# Patient Record
Sex: FEMALE | Race: WHITE | NOT HISPANIC OR LATINO
[De-identification: names, ages, dates, MRNs, and addresses within clinical notes are randomized per-mention and may not be internally consistent; named-entity substitution may affect disease eponyms.]

---

## 2017-12-15 ENCOUNTER — APPOINTMENT (OUTPATIENT)
Dept: GASTROENTEROLOGY | Facility: CLINIC | Age: 52
End: 2017-12-15
Payer: COMMERCIAL

## 2017-12-15 VITALS
WEIGHT: 276 LBS | SYSTOLIC BLOOD PRESSURE: 120 MMHG | DIASTOLIC BLOOD PRESSURE: 70 MMHG | HEIGHT: 67 IN | TEMPERATURE: 97.8 F | BODY MASS INDEX: 43.32 KG/M2

## 2017-12-15 DIAGNOSIS — I10 ESSENTIAL (PRIMARY) HYPERTENSION: ICD-10-CM

## 2017-12-15 DIAGNOSIS — Z83.42 FAMILY HISTORY OF FAMILIAL HYPERCHOLESTEROLEMIA: ICD-10-CM

## 2017-12-15 DIAGNOSIS — Z82.5 FAMILY HISTORY OF ASTHMA AND OTHER CHRONIC LOWER RESPIRATORY DISEASES: ICD-10-CM

## 2017-12-15 DIAGNOSIS — Z82.49 FAMILY HISTORY OF ISCHEMIC HEART DISEASE AND OTHER DISEASES OF THE CIRCULATORY SYSTEM: ICD-10-CM

## 2017-12-15 DIAGNOSIS — Z83.49 FAMILY HISTORY OF OTHER ENDOCRINE, NUTRITIONAL AND METABOLIC DISEASES: ICD-10-CM

## 2017-12-15 PROCEDURE — 99214 OFFICE O/P EST MOD 30 MIN: CPT

## 2017-12-15 RX ORDER — FOLIC ACID 1 MG/1
1 TABLET ORAL
Qty: 30 | Refills: 0 | Status: ACTIVE | COMMUNITY
Start: 2017-11-27

## 2017-12-15 RX ORDER — TOBRAMYCIN AND DEXAMETHASONE 3; 1 MG/ML; MG/ML
0.3-0.1 SUSPENSION/ DROPS OPHTHALMIC
Qty: 5 | Refills: 0 | Status: DISCONTINUED | COMMUNITY
Start: 2017-07-18

## 2017-12-15 RX ORDER — CLINDAMYCIN PHOSPHATE 10 MG/G
1 AEROSOL, FOAM TOPICAL
Qty: 100 | Refills: 0 | Status: DISCONTINUED | COMMUNITY
Start: 2017-08-02

## 2017-12-15 RX ORDER — METHYLPREDNISOLONE 4 MG/1
4 TABLET ORAL
Qty: 21 | Refills: 0 | Status: DISCONTINUED | COMMUNITY
Start: 2017-07-05

## 2017-12-15 RX ORDER — CLOBETASOL PROPIONATE 0.5 MG/G
0.05 CREAM TOPICAL
Qty: 15 | Refills: 0 | Status: ACTIVE | COMMUNITY
Start: 2017-12-08

## 2017-12-15 RX ORDER — ROSUVASTATIN CALCIUM 5 MG/1
5 TABLET, FILM COATED ORAL
Qty: 30 | Refills: 0 | Status: ACTIVE | COMMUNITY
Start: 2017-10-30

## 2017-12-15 RX ORDER — UBIDECARENONE/VIT E ACET 100MG-5
CAPSULE ORAL
Refills: 0 | Status: ACTIVE | COMMUNITY

## 2017-12-15 RX ORDER — DOXYCYCLINE 40 MG/1
40 CAPSULE ORAL
Qty: 30 | Refills: 0 | Status: DISCONTINUED | COMMUNITY
Start: 2017-08-02

## 2017-12-15 RX ORDER — FOLIC ACID 0.8 MG
500 TABLET ORAL
Refills: 0 | Status: ACTIVE | COMMUNITY

## 2017-12-15 RX ORDER — DESONIDE 0.5 MG/G
0.05 OINTMENT TOPICAL
Qty: 30 | Refills: 0 | Status: DISCONTINUED | COMMUNITY
Start: 2017-08-09

## 2017-12-15 RX ORDER — LEVOTHYROXINE SODIUM 200 UG/1
200 TABLET ORAL
Qty: 90 | Refills: 0 | Status: ACTIVE | COMMUNITY
Start: 2017-01-30

## 2017-12-15 RX ORDER — NEBIVOLOL HYDROCHLORIDE 2.5 MG/1
2.5 TABLET ORAL
Qty: 25 | Refills: 0 | Status: ACTIVE | COMMUNITY
Start: 2017-10-30

## 2017-12-19 ENCOUNTER — APPOINTMENT (OUTPATIENT)
Dept: GASTROENTEROLOGY | Facility: CLINIC | Age: 52
End: 2017-12-19

## 2018-02-15 ENCOUNTER — APPOINTMENT (OUTPATIENT)
Dept: SURGICAL ONCOLOGY | Facility: CLINIC | Age: 53
End: 2018-02-15
Payer: COMMERCIAL

## 2018-02-15 VITALS
HEIGHT: 67 IN | WEIGHT: 275 LBS | DIASTOLIC BLOOD PRESSURE: 81 MMHG | HEART RATE: 72 BPM | BODY MASS INDEX: 43.16 KG/M2 | RESPIRATION RATE: 15 BRPM | SYSTOLIC BLOOD PRESSURE: 140 MMHG

## 2018-02-15 DIAGNOSIS — Z87.19 PERSONAL HISTORY OF OTHER DISEASES OF THE DIGESTIVE SYSTEM: ICD-10-CM

## 2018-02-15 DIAGNOSIS — Z86.39 PERSONAL HISTORY OF OTHER ENDOCRINE, NUTRITIONAL AND METABOLIC DISEASE: ICD-10-CM

## 2018-02-15 DIAGNOSIS — Z87.891 PERSONAL HISTORY OF NICOTINE DEPENDENCE: ICD-10-CM

## 2018-02-15 DIAGNOSIS — Z87.09 PERSONAL HISTORY OF OTHER DISEASES OF THE RESPIRATORY SYSTEM: ICD-10-CM

## 2018-02-15 PROCEDURE — 99244 OFF/OP CNSLTJ NEW/EST MOD 40: CPT

## 2018-02-15 RX ORDER — ASPIRIN 81 MG
81 TABLET, DELAYED RELEASE (ENTERIC COATED) ORAL
Refills: 0 | Status: ACTIVE | COMMUNITY

## 2018-02-23 ENCOUNTER — RESULT REVIEW (OUTPATIENT)
Age: 53
End: 2018-02-23

## 2018-02-23 ENCOUNTER — OUTPATIENT (OUTPATIENT)
Dept: OUTPATIENT SERVICES | Facility: HOSPITAL | Age: 53
LOS: 1 days | End: 2018-02-23
Payer: COMMERCIAL

## 2018-02-23 DIAGNOSIS — L98.9 DISORDER OF THE SKIN AND SUBCUTANEOUS TISSUE, UNSPECIFIED: ICD-10-CM

## 2018-02-23 PROCEDURE — 88321 CONSLTJ&REPRT SLD PREP ELSWR: CPT

## 2018-02-27 LAB — SURGICAL PATHOLOGY STUDY: SIGNIFICANT CHANGE UP

## 2018-03-15 ENCOUNTER — FORM ENCOUNTER (OUTPATIENT)
Age: 53
End: 2018-03-15

## 2018-03-16 ENCOUNTER — OUTPATIENT (OUTPATIENT)
Dept: OUTPATIENT SERVICES | Facility: HOSPITAL | Age: 53
LOS: 1 days | End: 2018-03-16
Payer: COMMERCIAL

## 2018-03-16 VITALS
OXYGEN SATURATION: 97 % | DIASTOLIC BLOOD PRESSURE: 78 MMHG | RESPIRATION RATE: 16 BRPM | WEIGHT: 272.93 LBS | HEIGHT: 67 IN | TEMPERATURE: 98 F | HEART RATE: 71 BPM | SYSTOLIC BLOOD PRESSURE: 128 MMHG

## 2018-03-16 DIAGNOSIS — L23.9 ALLERGIC CONTACT DERMATITIS, UNSPECIFIED CAUSE: ICD-10-CM

## 2018-03-16 DIAGNOSIS — D03.61 MELANOMA IN SITU OF RIGHT UPPER LIMB, INCLUDING SHOULDER: ICD-10-CM

## 2018-03-16 DIAGNOSIS — Z98.890 OTHER SPECIFIED POSTPROCEDURAL STATES: Chronic | ICD-10-CM

## 2018-03-16 DIAGNOSIS — I10 ESSENTIAL (PRIMARY) HYPERTENSION: ICD-10-CM

## 2018-03-16 DIAGNOSIS — Z01.818 ENCOUNTER FOR OTHER PREPROCEDURAL EXAMINATION: ICD-10-CM

## 2018-03-16 DIAGNOSIS — G47.33 OBSTRUCTIVE SLEEP APNEA (ADULT) (PEDIATRIC): ICD-10-CM

## 2018-03-16 LAB
ANION GAP SERPL CALC-SCNC: 12 MMOL/L — SIGNIFICANT CHANGE UP (ref 5–17)
BUN SERPL-MCNC: 12 MG/DL — SIGNIFICANT CHANGE UP (ref 7–23)
CALCIUM SERPL-MCNC: 10.3 MG/DL — SIGNIFICANT CHANGE UP (ref 8.4–10.5)
CHLORIDE SERPL-SCNC: 103 MMOL/L — SIGNIFICANT CHANGE UP (ref 96–108)
CO2 SERPL-SCNC: 29 MMOL/L — SIGNIFICANT CHANGE UP (ref 22–31)
CREAT SERPL-MCNC: 0.8 MG/DL — SIGNIFICANT CHANGE UP (ref 0.5–1.3)
GLUCOSE SERPL-MCNC: 102 MG/DL — HIGH (ref 70–99)
POTASSIUM SERPL-MCNC: 5.3 MMOL/L — SIGNIFICANT CHANGE UP (ref 3.5–5.3)
POTASSIUM SERPL-SCNC: 5.3 MMOL/L — SIGNIFICANT CHANGE UP (ref 3.5–5.3)
SODIUM SERPL-SCNC: 144 MMOL/L — SIGNIFICANT CHANGE UP (ref 135–145)

## 2018-03-16 PROCEDURE — 71046 X-RAY EXAM CHEST 2 VIEWS: CPT | Mod: 26

## 2018-03-16 PROCEDURE — 80048 BASIC METABOLIC PNL TOTAL CA: CPT

## 2018-03-16 PROCEDURE — 71046 X-RAY EXAM CHEST 2 VIEWS: CPT

## 2018-03-16 PROCEDURE — G0463: CPT

## 2018-03-16 RX ORDER — SODIUM CHLORIDE 9 MG/ML
3 INJECTION INTRAMUSCULAR; INTRAVENOUS; SUBCUTANEOUS EVERY 8 HOURS
Qty: 0 | Refills: 0 | Status: DISCONTINUED | OUTPATIENT
Start: 2018-03-23 | End: 2018-04-07

## 2018-03-16 NOTE — H&P PST ADULT - SKIN/BREAST COMMENTS
Rash face and chest  ( contact dermatitis) seeing Allergist and being tested, pt will find out results today Rash face and chest  ( Pt reports it is contact dermatitis) seeing Allergist and being tested, pt will find out results today

## 2018-03-16 NOTE — H&P PST ADULT - HISTORY OF PRESENT ILLNESS
52 Y/O Female 54 Y/O Female reports she went to dermatologist because she had a dark mole on her right shoulder. S/P biopsy right shoulder. Diagnosed with Melanoma right shoulder. Presents for Wide excison Melanoma right shoulder , Plastics 3/23/18

## 2018-03-16 NOTE — H&P PST ADULT - PMH
Allergic contact dermatitis, unspecified trigger  Face and chest, pt is in process of skin testing by dermatologist, Rash is not on right shoulder, advised pt to call Dr Swartz if the rash is on right shoulder. Pt states she thinks in contact dermatitis.  Diverticulitis    Essential hypertension    Hyperlipidemia, unspecified hyperlipidemia type    Hypothyroid    Mass of breast, right  benign 2012  MARIAA on CPAP

## 2018-03-16 NOTE — H&P PST ADULT - SKIN
detailed exam color normal/warm and dry/slight rash face and chest, it is not affecting right shoulder.

## 2018-03-16 NOTE — H&P PST ADULT - PROBLEM SELECTOR PLAN 4
Instructed pt to call Dr Swartz if rash goes to right shoulder. Advised Bettie Swartz OR booking person about rash.

## 2018-03-16 NOTE — H&P PST ADULT - ATTENDING COMMENTS
53-year-old lady recently diagnosed with a possible 0.25 mm melanoma of the posterior right shoulder. The in situ lesion has focal invasion to a depth of 0.25 mm with no prognostic factors.    The diagnosis and technique for resection had been reviewed with the patient in my office, and again this morning with her and her friend Ms Mckenna.    All questions answered.    Consent on chart

## 2018-03-20 ENCOUNTER — APPOINTMENT (OUTPATIENT)
Dept: GASTROENTEROLOGY | Facility: AMBULATORY MEDICAL SERVICES | Age: 53
End: 2018-03-20
Payer: COMMERCIAL

## 2018-03-20 PROCEDURE — G0121 COLON CA SCRN NOT HI RSK IND: CPT | Mod: 33

## 2018-03-22 ENCOUNTER — TRANSCRIPTION ENCOUNTER (OUTPATIENT)
Age: 53
End: 2018-03-22

## 2018-03-23 ENCOUNTER — RESULT REVIEW (OUTPATIENT)
Age: 53
End: 2018-03-23

## 2018-03-23 ENCOUNTER — OUTPATIENT (OUTPATIENT)
Dept: OUTPATIENT SERVICES | Facility: HOSPITAL | Age: 53
LOS: 1 days | End: 2018-03-23
Payer: COMMERCIAL

## 2018-03-23 ENCOUNTER — APPOINTMENT (OUTPATIENT)
Dept: SURGICAL ONCOLOGY | Facility: HOSPITAL | Age: 53
End: 2018-03-23

## 2018-03-23 VITALS
HEART RATE: 64 BPM | DIASTOLIC BLOOD PRESSURE: 68 MMHG | WEIGHT: 272.93 LBS | OXYGEN SATURATION: 98 % | RESPIRATION RATE: 16 BRPM | TEMPERATURE: 98 F | HEIGHT: 67 IN | SYSTOLIC BLOOD PRESSURE: 120 MMHG

## 2018-03-23 VITALS
RESPIRATION RATE: 17 BRPM | DIASTOLIC BLOOD PRESSURE: 67 MMHG | SYSTOLIC BLOOD PRESSURE: 121 MMHG | OXYGEN SATURATION: 100 % | HEART RATE: 78 BPM

## 2018-03-23 DIAGNOSIS — Z98.890 OTHER SPECIFIED POSTPROCEDURAL STATES: Chronic | ICD-10-CM

## 2018-03-23 DIAGNOSIS — Z01.818 ENCOUNTER FOR OTHER PREPROCEDURAL EXAMINATION: ICD-10-CM

## 2018-03-23 DIAGNOSIS — D03.61 MELANOMA IN SITU OF RIGHT UPPER LIMB, INCLUDING SHOULDER: ICD-10-CM

## 2018-03-23 PROCEDURE — 23071 EXC SHOULDER LES SC 3 CM/>: CPT | Mod: RT

## 2018-03-23 PROCEDURE — 15736 MUSCLE-SKIN GRAFT ARM: CPT

## 2018-03-23 PROCEDURE — 11606 EXC TR-EXT MAL+MARG >4 CM: CPT

## 2018-03-23 RX ORDER — NEBIVOLOL HYDROCHLORIDE 5 MG/1
1 TABLET ORAL
Qty: 0 | Refills: 0 | COMMUNITY

## 2018-03-23 RX ORDER — FAMOTIDINE 10 MG/ML
0 INJECTION INTRAVENOUS
Qty: 0 | Refills: 0 | COMMUNITY

## 2018-03-23 RX ORDER — ASPIRIN/CALCIUM CARB/MAGNESIUM 324 MG
1 TABLET ORAL
Qty: 0 | Refills: 0 | COMMUNITY

## 2018-03-23 RX ORDER — FOLIC ACID 0.8 MG
0 TABLET ORAL
Qty: 0 | Refills: 0 | COMMUNITY

## 2018-03-23 RX ORDER — ROSUVASTATIN CALCIUM 5 MG/1
0 TABLET ORAL
Qty: 0 | Refills: 0 | COMMUNITY

## 2018-03-23 RX ORDER — LEVOTHYROXINE SODIUM 125 MCG
1 TABLET ORAL
Qty: 0 | Refills: 0 | COMMUNITY

## 2018-03-23 RX ORDER — SODIUM CHLORIDE 9 MG/ML
1000 INJECTION, SOLUTION INTRAVENOUS
Qty: 0 | Refills: 0 | Status: DISCONTINUED | OUTPATIENT
Start: 2018-03-23 | End: 2018-04-07

## 2018-03-23 NOTE — BRIEF OPERATIVE NOTE - PROCEDURE
<<-----Click on this checkbox to enter Procedure Melanoma excision  03/23/2018  Possible 0.25 mm melanoma posterior right shoulder, excision with reconstruction by Dr. Darline BRUMFIELD

## 2018-03-23 NOTE — ASU DISCHARGE PLAN (ADULT/PEDIATRIC). - ITEMS TO FOLLOWUP WITH YOUR PHYSICIAN'S
See Dr. Gregorio in the next week or two.    Dr. Swartz should call with pathology report by April 03 Follow up with Dr. Gregorio/Dr. Rowe (partner) on April 4th, 2018. You can call office to confirm your appointment.    Dr. Swartz should call with pathology report by April 03

## 2018-03-23 NOTE — CHART NOTE - NSCHARTNOTEFT_GEN_A_CORE
Patient seen & examined    53 year old female with no prior history of malignancy presents for excision of right shoulder melanoma in situ    Exam  Ontop of R shoulder, small ~ 5mm pigmented lesion with biopsy scar noted    Plan  Wide local excision with margins of 0.5 cm for melanoma in situ of R shoulder    Agustin Skinner  PGY-1

## 2018-03-23 NOTE — ASU DISCHARGE PLAN (ADULT/PEDIATRIC). - YOU WERE IN THE HOSPITAL FOR:
Excision of melanoma of the back of right shoulder with reconstruction by Dr. Gregorio Excision of melanoma of the back of right shoulder with complex closure for reconstruciton

## 2018-03-23 NOTE — ASU DISCHARGE PLAN (ADULT/PEDIATRIC). - MEDICATION SUMMARY - MEDICATIONS TO TAKE
I will START or STAY ON the medications listed below when I get home from the hospital:    aspirin 81 mg oral tablet  -- 1 tab(s) by mouth once a day (at bedtime)  -- Indication: For Home medication    rosuvastatin  -- orally once a day (at bedtime)  -- Indication: For Home medication    Bystolic 2.5 mg oral tablet  -- 1 tab(s) by mouth once a day (at bedtime)  -- Indication: For Home medication    Synthroid 200 mcg (0.2 mg) oral tablet  -- 1 tab(s) by mouth once a day  -- Indication: For Home medication    folic acid  -- orally once a day (at bedtime)  -- Indication: For Home medication

## 2018-03-23 NOTE — ASU DISCHARGE PLAN (ADULT/PEDIATRIC). - NOTIFY
Swelling that continues/Persistent Nausea and Vomiting/Increased Irritability or Sluggishness/Inability to Tolerate Liquids or Foods/Numbness, color, or temperature change to extremity/Pain not relieved by Medications/Excessive Diarrhea/Fever greater than 101/Numbness, tingling/Bleeding that does not stop/Unable to Urinate Pain not relieved by Medications/Fever greater than 101/Persistent Nausea and Vomiting/Bleeding that does not stop/Numbness, color, or temperature change to extremity/Swelling that continues

## 2018-03-29 LAB — SURGICAL PATHOLOGY STUDY: SIGNIFICANT CHANGE UP

## 2018-04-23 ENCOUNTER — APPOINTMENT (OUTPATIENT)
Dept: SURGICAL ONCOLOGY | Facility: CLINIC | Age: 53
End: 2018-04-23
Payer: COMMERCIAL

## 2018-04-23 VITALS
HEART RATE: 62 BPM | BODY MASS INDEX: 43.16 KG/M2 | RESPIRATION RATE: 15 BRPM | SYSTOLIC BLOOD PRESSURE: 130 MMHG | DIASTOLIC BLOOD PRESSURE: 85 MMHG | HEIGHT: 67 IN | WEIGHT: 275 LBS

## 2018-04-23 DIAGNOSIS — C43.61 MALIGNANT MELANOMA OF RIGHT UPPER LIMB, INCLUDING SHOULDER: ICD-10-CM

## 2018-04-23 PROCEDURE — 99215 OFFICE O/P EST HI 40 MIN: CPT

## 2018-07-17 PROBLEM — L23.9 ALLERGIC CONTACT DERMATITIS, UNSPECIFIED CAUSE: Chronic | Status: ACTIVE | Noted: 2018-03-16

## 2018-11-15 ENCOUNTER — APPOINTMENT (OUTPATIENT)
Dept: SURGICAL ONCOLOGY | Facility: CLINIC | Age: 53
End: 2018-11-15
Payer: COMMERCIAL

## 2018-11-15 VITALS
WEIGHT: 280 LBS | SYSTOLIC BLOOD PRESSURE: 117 MMHG | HEART RATE: 78 BPM | BODY MASS INDEX: 43.95 KG/M2 | HEIGHT: 67 IN | OXYGEN SATURATION: 98 % | DIASTOLIC BLOOD PRESSURE: 75 MMHG

## 2018-11-15 PROCEDURE — 99213 OFFICE O/P EST LOW 20 MIN: CPT

## 2018-11-16 PROBLEM — E78.5 HYPERLIPIDEMIA, UNSPECIFIED: Chronic | Status: ACTIVE | Noted: 2018-03-16

## 2018-11-16 PROBLEM — K57.92 DIVERTICULITIS OF INTESTINE, PART UNSPECIFIED, WITHOUT PERFORATION OR ABSCESS WITHOUT BLEEDING: Chronic | Status: ACTIVE | Noted: 2018-03-16

## 2018-11-16 PROBLEM — E03.9 HYPOTHYROIDISM, UNSPECIFIED: Chronic | Status: ACTIVE | Noted: 2018-03-16

## 2018-11-16 PROBLEM — I10 ESSENTIAL (PRIMARY) HYPERTENSION: Chronic | Status: ACTIVE | Noted: 2018-03-16

## 2018-11-16 PROBLEM — G47.33 OBSTRUCTIVE SLEEP APNEA (ADULT) (PEDIATRIC): Chronic | Status: ACTIVE | Noted: 2018-03-16

## 2018-11-16 PROBLEM — N63.10 UNSPECIFIED LUMP IN THE RIGHT BREAST, UNSPECIFIED QUADRANT: Chronic | Status: ACTIVE | Noted: 2018-03-16

## 2019-03-26 ENCOUNTER — APPOINTMENT (OUTPATIENT)
Dept: GASTROENTEROLOGY | Facility: CLINIC | Age: 54
End: 2019-03-26
Payer: COMMERCIAL

## 2019-03-26 VITALS
WEIGHT: 281 LBS | DIASTOLIC BLOOD PRESSURE: 76 MMHG | BODY MASS INDEX: 44.1 KG/M2 | SYSTOLIC BLOOD PRESSURE: 112 MMHG | HEART RATE: 72 BPM | OXYGEN SATURATION: 98 % | HEIGHT: 67 IN | TEMPERATURE: 99 F

## 2019-03-26 PROCEDURE — 99214 OFFICE O/P EST MOD 30 MIN: CPT

## 2019-03-26 NOTE — ASSESSMENT
[FreeTextEntry1] : Impression\par \par Patient presents to the office today with complaints of left low quadrant discomfort improved she has a history of mild diverticulitis and has had previous CT and colonoscopy within the last 18 months. As she is feeling better I see no need to further investigate at this time. Continue antibiotic for one week and discontinue with observation for additional symptoms of nausea vomiting fever chills rectal bleeding et cetera. Patient will call back p.r.n.

## 2019-03-26 NOTE — HISTORY OF PRESENT ILLNESS
[FreeTextEntry1] : Patient presents with complaints of left lower quadrant pain. Patient went to walk-in medical clinic and was diagnosed with mild diverticulitis she was started on antibiotic therapy. Patient had been feeling some discomfort and felt that it was similar to previously diagnosed diverticulitis. Patient is currently feeling better within 24 hours of antibiotic treatment she denies current fever chills nausea vomiting. She still has slight left lower quadrant discomfort.

## 2019-03-26 NOTE — PHYSICAL EXAM
[General Appearance - Alert] : alert [General Appearance - In No Acute Distress] : in no acute distress [General Appearance - Well Nourished] : well nourished [General Appearance - Well Developed] : well developed [General Appearance - Well-Appearing] : healthy appearing [Sclera] : the sclera and conjunctiva were normal [PERRL With Normal Accommodation] : pupils were equal in size, round, and reactive to light [Extraocular Movements] : extraocular movements were intact [Outer Ear] : the ears and nose were normal in appearance [Oropharynx] : the oropharynx was normal [Neck Appearance] : the appearance of the neck was normal [Neck Cervical Mass (___cm)] : no neck mass was observed [Jugular Venous Distention Increased] : there was no jugular-venous distention [Thyroid Diffuse Enlargement] : the thyroid was not enlarged [Thyroid Nodule] : there were no palpable thyroid nodules [Auscultation Breath Sounds / Voice Sounds] : lungs were clear to auscultation bilaterally [Heart Rate And Rhythm] : heart rate was normal and rhythm regular [Heart Sounds] : normal S1 and S2 [Heart Sounds Gallop] : no gallops [Murmurs] : no murmurs [Heart Sounds Pericardial Friction Rub] : no pericardial rub [Edema] : there was no peripheral edema [Veins - Varicosity Changes] : there were no varicosital changes [Bowel Sounds] : normal bowel sounds [Abdomen Soft] : soft [Abdomen Mass (___ Cm)] : no abdominal mass palpated [FreeTextEntry1] : minimal  [Cervical Lymph Nodes Enlarged Posterior Bilaterally] : posterior cervical [Cervical Lymph Nodes Enlarged Anterior Bilaterally] : anterior cervical [Supraclavicular Lymph Nodes Enlarged Bilaterally] : supraclavicular [No CVA Tenderness] : no ~M costovertebral angle tenderness [No Spinal Tenderness] : no spinal tenderness [Abnormal Walk] : normal gait [Nail Clubbing] : no clubbing  or cyanosis of the fingernails [Musculoskeletal - Swelling] : no joint swelling seen [Motor Tone] : muscle strength and tone were normal [Skin Color & Pigmentation] : normal skin color and pigmentation [Skin Turgor] : normal skin turgor [] : no rash [Deep Tendon Reflexes (DTR)] : deep tendon reflexes were 2+ and symmetric [Sensation] : the sensory exam was normal to light touch and pinprick [No Focal Deficits] : no focal deficits [Oriented To Time, Place, And Person] : oriented to person, place, and time [Impaired Insight] : insight and judgment were intact [Affect] : the affect was normal

## 2019-05-22 ENCOUNTER — APPOINTMENT (OUTPATIENT)
Dept: GASTROENTEROLOGY | Facility: CLINIC | Age: 54
End: 2019-05-22

## 2019-11-14 ENCOUNTER — APPOINTMENT (OUTPATIENT)
Dept: SURGICAL ONCOLOGY | Facility: CLINIC | Age: 54
End: 2019-11-14
Payer: COMMERCIAL

## 2019-11-14 VITALS
OXYGEN SATURATION: 97 % | SYSTOLIC BLOOD PRESSURE: 128 MMHG | DIASTOLIC BLOOD PRESSURE: 69 MMHG | HEIGHT: 67 IN | HEART RATE: 65 BPM

## 2019-11-14 PROCEDURE — 99214 OFFICE O/P EST MOD 30 MIN: CPT

## 2019-11-21 ENCOUNTER — FORM ENCOUNTER (OUTPATIENT)
Age: 54
End: 2019-11-21

## 2019-11-22 ENCOUNTER — APPOINTMENT (OUTPATIENT)
Dept: RADIOLOGY | Facility: CLINIC | Age: 54
End: 2019-11-22
Payer: COMMERCIAL

## 2019-11-22 ENCOUNTER — OUTPATIENT (OUTPATIENT)
Dept: OUTPATIENT SERVICES | Facility: HOSPITAL | Age: 54
LOS: 1 days | End: 2019-11-22
Payer: COMMERCIAL

## 2019-11-22 DIAGNOSIS — Z98.890 OTHER SPECIFIED POSTPROCEDURAL STATES: Chronic | ICD-10-CM

## 2019-11-22 DIAGNOSIS — Z00.8 ENCOUNTER FOR OTHER GENERAL EXAMINATION: ICD-10-CM

## 2019-11-22 PROCEDURE — 71046 X-RAY EXAM CHEST 2 VIEWS: CPT

## 2019-11-22 PROCEDURE — 71046 X-RAY EXAM CHEST 2 VIEWS: CPT | Mod: 26

## 2020-01-23 ENCOUNTER — APPOINTMENT (OUTPATIENT)
Dept: GASTROENTEROLOGY | Facility: CLINIC | Age: 55
End: 2020-01-23
Payer: COMMERCIAL

## 2020-01-23 VITALS
HEART RATE: 70 BPM | WEIGHT: 290 LBS | TEMPERATURE: 97.8 F | BODY MASS INDEX: 45.52 KG/M2 | DIASTOLIC BLOOD PRESSURE: 80 MMHG | OXYGEN SATURATION: 98 % | HEIGHT: 67 IN | SYSTOLIC BLOOD PRESSURE: 120 MMHG

## 2020-01-23 DIAGNOSIS — K57.32 DIVERTICULITIS OF LARGE INTESTINE W/OUT PERFORATION OR ABSCESS W/OUT BLEEDING: ICD-10-CM

## 2020-01-23 PROCEDURE — 99214 OFFICE O/P EST MOD 30 MIN: CPT

## 2020-01-23 RX ORDER — METRONIDAZOLE 500 MG/1
500 TABLET ORAL
Qty: 21 | Refills: 0 | Status: ACTIVE | COMMUNITY
Start: 2017-12-05 | End: 1900-01-01

## 2020-01-23 NOTE — PHYSICAL EXAM
[General Appearance - Alert] : alert [General Appearance - Well Nourished] : well nourished [General Appearance - Well Developed] : well developed [General Appearance - In No Acute Distress] : in no acute distress [Sclera] : the sclera and conjunctiva were normal [PERRL With Normal Accommodation] : pupils were equal in size, round, and reactive to light [General Appearance - Well-Appearing] : healthy appearing [Extraocular Movements] : extraocular movements were intact [Outer Ear] : the ears and nose were normal in appearance [Oropharynx] : the oropharynx was normal [Neck Appearance] : the appearance of the neck was normal [Neck Cervical Mass (___cm)] : no neck mass was observed [Thyroid Diffuse Enlargement] : the thyroid was not enlarged [Thyroid Nodule] : there were no palpable thyroid nodules [Jugular Venous Distention Increased] : there was no jugular-venous distention [Heart Rate And Rhythm] : heart rate was normal and rhythm regular [Auscultation Breath Sounds / Voice Sounds] : lungs were clear to auscultation bilaterally [Heart Sounds Gallop] : no gallops [Murmurs] : no murmurs [Heart Sounds] : normal S1 and S2 [Edema] : there was no peripheral edema [Heart Sounds Pericardial Friction Rub] : no pericardial rub [Veins - Varicosity Changes] : there were no varicosital changes [Bowel Sounds] : normal bowel sounds [Abdomen Mass (___ Cm)] : no abdominal mass palpated [Abdomen Soft] : soft [Cervical Lymph Nodes Enlarged Posterior Bilaterally] : posterior cervical [FreeTextEntry1] : minimal  [Cervical Lymph Nodes Enlarged Anterior Bilaterally] : anterior cervical [Supraclavicular Lymph Nodes Enlarged Bilaterally] : supraclavicular [No Spinal Tenderness] : no spinal tenderness [No CVA Tenderness] : no ~M costovertebral angle tenderness [Abnormal Walk] : normal gait [Motor Tone] : muscle strength and tone were normal [Musculoskeletal - Swelling] : no joint swelling seen [Nail Clubbing] : no clubbing  or cyanosis of the fingernails [Skin Color & Pigmentation] : normal skin color and pigmentation [Skin Turgor] : normal skin turgor [Deep Tendon Reflexes (DTR)] : deep tendon reflexes were 2+ and symmetric [Sensation] : the sensory exam was normal to light touch and pinprick [] : no rash [No Focal Deficits] : no focal deficits [Oriented To Time, Place, And Person] : oriented to person, place, and time [Affect] : the affect was normal [Impaired Insight] : insight and judgment were intact

## 2020-01-23 NOTE — HISTORY OF PRESENT ILLNESS
[FreeTextEntry1] : Patient came to the office today with mild diverticulitis. She has typical symptoms of the left lower quadrant she self started antibiotics he came today for further discussion and refill of medication.

## 2020-01-23 NOTE — ASSESSMENT
[FreeTextEntry1] : Impression and plan\par \par Patient presents to the office today for symptoms suggestive of mild diverticulitis. Patient self started antibiotics that she had left over from last time and is feeling a bit better. I suggested that given clinical history and presentation and she continues at least a 5 day course of oral antibiotics including Cipro and Flagyl which were refilled today. Patient will call back if any worsening or change in symptoms. Telephone follow up in 5 days is requested patient will submit to a colonoscopy later this year and CT scan will be obtained if required.

## 2020-03-06 ENCOUNTER — APPOINTMENT (OUTPATIENT)
Dept: GASTROENTEROLOGY | Facility: CLINIC | Age: 55
End: 2020-03-06
Payer: COMMERCIAL

## 2020-03-06 VITALS
DIASTOLIC BLOOD PRESSURE: 68 MMHG | HEART RATE: 89 BPM | HEIGHT: 67 IN | SYSTOLIC BLOOD PRESSURE: 120 MMHG | BODY MASS INDEX: 45.52 KG/M2 | WEIGHT: 290 LBS | OXYGEN SATURATION: 99 % | TEMPERATURE: 97.6 F

## 2020-03-06 DIAGNOSIS — R19.5 OTHER FECAL ABNORMALITIES: ICD-10-CM

## 2020-03-06 DIAGNOSIS — Z87.19 PERSONAL HISTORY OF OTHER DISEASES OF THE DIGESTIVE SYSTEM: ICD-10-CM

## 2020-03-06 DIAGNOSIS — K59.00 CONSTIPATION, UNSPECIFIED: ICD-10-CM

## 2020-03-06 PROCEDURE — 99214 OFFICE O/P EST MOD 30 MIN: CPT

## 2020-03-06 NOTE — ASSESSMENT
[FreeTextEntry1] : Impression\par \par Mucus with stool after vacation in [place] resort\par \par Constipation\par \par History of diverticulitis\par \par Normal abdominal exam\par \par Suggest\par \par CBC today\par \par Stool studies\par \par Follow up with me or Dr. Tone Gibson\par \par Miralax

## 2020-03-06 NOTE — CONSULT LETTER
[Dear  ___] : Dear  [unfilled], [Consult Letter:] : I had the pleasure of evaluating your patient, [unfilled]. [Please see my note below.] : Please see my note below. [Consult Closing:] : Thank you very much for allowing me to participate in the care of this patient.  If you have any questions, please do not hesitate to contact me. [Sincerely,] : Sincerely, [FreeTextEntry2] : Dr. Osorio Severino\par \par Internal medicine\par \par [address] [facility] [facility], [place] [address]\par \par [phone] [FreeTextEntry3] : Alfonso Duke MD\par

## 2020-03-06 NOTE — PHYSICAL EXAM
[General Appearance - Alert] : alert [General Appearance - In No Acute Distress] : in no acute distress [FreeTextEntry1] : Pleasant heavyset female, no acute distress alert and oriented times [Sclera] : the sclera and conjunctiva were normal [Neck Appearance] : the appearance of the neck was normal [Neck Cervical Mass (___cm)] : no neck mass was observed [Jugular Venous Distention Increased] : there was no jugular-venous distention [Auscultation Breath Sounds / Voice Sounds] : lungs were clear to auscultation bilaterally [Apical Impulse] : the apical impulse was normal [Heart Rate And Rhythm] : heart rate was normal and rhythm regular [Full Pulse] : the pedal pulses are present [Edema] : there was no peripheral edema [Bowel Sounds] : normal bowel sounds [Abdomen Soft] : soft [Abdomen Tenderness] : non-tender [Abdomen Mass (___ Cm)] : no abdominal mass palpated [Normal Sphincter Tone] : normal sphincter tone [No Rectal Mass] : no rectal mass [Occult Blood Positive] : stool was negative for occult blood [Cervical Lymph Nodes Enlarged Posterior Bilaterally] : posterior cervical [Cervical Lymph Nodes Enlarged Anterior Bilaterally] : anterior cervical [Supraclavicular Lymph Nodes Enlarged Bilaterally] : supraclavicular [Axillary Lymph Nodes Enlarged Bilaterally] : axillary [Femoral Lymph Nodes Enlarged Bilaterally] : femoral [Inguinal Lymph Nodes Enlarged Bilaterally] : inguinal [No CVA Tenderness] : no ~M costovertebral angle tenderness [No Spinal Tenderness] : no spinal tenderness [Abnormal Walk] : normal gait [Nail Clubbing] : no clubbing  or cyanosis of the fingernails [Musculoskeletal - Swelling] : no joint swelling seen [Motor Tone] : muscle strength and tone were normal [Skin Color & Pigmentation] : normal skin color and pigmentation [Skin Turgor] : normal skin turgor [] : no rash [No Focal Deficits] : no focal deficits [Oriented To Time, Place, And Person] : oriented to person, place, and time [Impaired Insight] : insight and judgment were intact [Affect] : the affect was normal

## 2020-03-06 NOTE — HISTORY OF PRESENT ILLNESS
[de-identified] : Dr. Osorio Severino\par \par Internal medicine\par \par [address] [facility], [place] [address]\par \par [phone]\par \par \par Very pleasant 55-year-old female\par \par History of diverticulitis\par \par She's having several weeks of mucus with stool\par \par No blood per rectum\par \par Mild constipation\par \par No abdominal pain\par \par No fever, chills or sweats\par \par This occurred after a family vacation to a resort [place]\par \par \par

## 2020-03-07 LAB
BASOPHILS # BLD AUTO: 0.04 K/UL
BASOPHILS NFR BLD AUTO: 0.5 %
EOSINOPHIL # BLD AUTO: 0.28 K/UL
EOSINOPHIL NFR BLD AUTO: 3.4 %
HCT VFR BLD CALC: 44.4 %
HGB BLD-MCNC: 14.2 G/DL
IMM GRANULOCYTES NFR BLD AUTO: 0.2 %
LYMPHOCYTES # BLD AUTO: 1.86 K/UL
LYMPHOCYTES NFR BLD AUTO: 22.4 %
MAN DIFF?: NORMAL
MCHC RBC-ENTMCNC: 28.5 PG
MCHC RBC-ENTMCNC: 32 GM/DL
MCV RBC AUTO: 89.2 FL
MONOCYTES # BLD AUTO: 0.76 K/UL
MONOCYTES NFR BLD AUTO: 9.1 %
NEUTROPHILS # BLD AUTO: 5.36 K/UL
NEUTROPHILS NFR BLD AUTO: 64.4 %
PLATELET # BLD AUTO: 251 K/UL
RBC # BLD: 4.98 M/UL
RBC # FLD: 12.9 %
WBC # FLD AUTO: 8.32 K/UL

## 2020-03-09 LAB
C DIFF TOX GENS STL QL NAA+PROBE: NORMAL
CDIFF BY PCR: DETECTED
G LAMBLIA AG STL QL: NORMAL
GI PCR PANEL, STOOL: NORMAL

## 2020-03-09 RX ORDER — VANCOMYCIN HYDROCHLORIDE 250 MG/1
250 CAPSULE ORAL 4 TIMES DAILY
Qty: 56 | Refills: 0 | Status: ACTIVE | COMMUNITY
Start: 2020-03-09 | End: 1900-01-01

## 2020-03-11 LAB
BACTERIA STL CULT: NORMAL
C DIFF TOX B STL QL CT TISS CULT: ABNORMAL
Lab: ABNORMAL

## 2020-03-12 LAB — DEPRECATED O AND P PREP STL: NORMAL

## 2020-04-14 ENCOUNTER — APPOINTMENT (OUTPATIENT)
Dept: GASTROENTEROLOGY | Facility: CLINIC | Age: 55
End: 2020-04-14
Payer: COMMERCIAL

## 2020-04-14 PROCEDURE — 99213 OFFICE O/P EST LOW 20 MIN: CPT

## 2020-04-14 RX ORDER — VANCOMYCIN HYDROCHLORIDE 250 MG/1
250 CAPSULE ORAL 4 TIMES DAILY
Qty: 40 | Refills: 2 | Status: ACTIVE | COMMUNITY
Start: 2020-04-14 | End: 1900-01-01

## 2020-04-14 NOTE — HISTORY OF PRESENT ILLNESS
[Home] : at home, [unfilled] , at the time of the visit. [Medical Office: ([facility])___] : at the medical office located in  [Patient] : the patient [Self] : self [FreeTextEntry1] : Patient and I had a telephonic visit secondary to current coronavirus situation. Patient called the office because of renewed symptomatology of mucus and rectal urgency. Patient has been recently treated for C. difficile colitis with vancomycin and shortly after discontinuing medication she noted some recurrence. She was concerned about this and called. Patient currently denies fever chills nausea vomiting rectal bleeding or melena. She does have some mucus admixed with the stool. She has some rectal urgency as well.

## 2020-04-18 NOTE — HISTORY OF PRESENT ILLNESS
[de-identified] : 54 year-old lady who March 2018 had wide excision of a possible 0.25 mm melanoma from the posterior RIGHT SHOULDER with negative margins and reconstruction by Dr. Gregorio.\par \par February 2017 she had wide excision of an in situ melanoma from her of her RIGHT SHOULDER, again with negative margins.\par \par No other personal history of skin cancer.\par \par No other personal history of malignancy.\par \par No relatives with melanoma.\par There are no relatives with skin cancer.\par \par She returns to scheduled followup today.\par She is asymptomatic\par \par \par \par Her dermatologist was Dr. Rodrigo Crook.\par NOW sees Dr Perri BOATENG @[facility], October 2019 visit was unremarkable.\par She also sees Dr. Tonie Ravi, December 2019 appointment is scheduled\par \par \par She has OBSTRUCTIVE SLEEP APNEA, her pulmonologist is Dr. Med MORENO, she uses a CPAP mask at home\par \par \par \par Her internist is Dr. Osorio CHANEL.\par \par She does not have a pacemaker or defibrillator.\par She takes precautionary baby aspirin daily.\par \par She's on Bystolic for hypertension.\par Rosuvastatin is used for hypercholesterolemia.\par She is on Synthroid for hypothyroidism, She does not see an endocrinologist.\par \par Her gynecologist is Dr. Raphael CARBAJAL\par She last saw him September 2019 - [place]\par \par Her annual breast imaging is orchestrated By her gynecologist , August 2019 at [place] - ok\par \par +FH:\par Her maternal grandmother had breast cancer at age 52.\par Her paternal grandmother had ovarian cancer.\par \par An eye examination November 2019 was normal., Dr Alfonso Marte\par \par She had colonoscopy March 2018 with Dr. Edmar Frye - [place] x 5 yr\par History of diverticular disease

## 2020-04-18 NOTE — ASSESSMENT
[FreeTextEntry1] : March 2018 chest x-ray at [place] was unremarkable.\par Consideration will be given to a followup study in 2019, Prescription entered\par \par Clinically doing well.\par I've asked to see her in another year, sooner if needed\par \par \par 11-22-19:\par Chest x-ray at [place]: clear lungs.\par Prescription for November 2020 was entered 04/18/20

## 2020-04-18 NOTE — PHYSICAL EXAM
[Normal] : supple, no neck mass and thyroid not enlarged [Normal Neck Lymph Nodes] : normal neck lymph nodes  [Normal Supraclavicular Lymph Nodes] : normal supraclavicular lymph nodes [Normal Axillary Lymph Nodes] : normal axillary lymph nodes [Normal] : grossly intact [de-identified] : Groins not examined [de-identified] : Below

## 2020-04-18 NOTE — REVIEW OF SYSTEMS
[Negative] : Heme/Lymph [FreeTextEntry5] : Hypertension [FreeTextEntry6] : MARIAA [de-identified] : Melanoma [de-identified] : Hypothyroid

## 2020-05-02 RX ORDER — VANCOMYCIN HYDROCHLORIDE 250 MG/1
250 CAPSULE ORAL 4 TIMES DAILY
Qty: 40 | Refills: 2 | Status: ACTIVE | COMMUNITY
Start: 2020-05-02 | End: 1900-01-01

## 2020-05-20 RX ORDER — SODIUM SULFATE, POTASSIUM SULFATE, MAGNESIUM SULFATE 17.5; 3.13; 1.6 G/ML; G/ML; G/ML
17.5-3.13-1.6 SOLUTION, CONCENTRATE ORAL
Qty: 1 | Refills: 0 | Status: ACTIVE | COMMUNITY
Start: 2020-05-20 | End: 1900-01-01

## 2020-05-21 RX ORDER — VANCOMYCIN HYDROCHLORIDE 250 MG/1
250 CAPSULE ORAL 4 TIMES DAILY
Qty: 40 | Refills: 2 | Status: ACTIVE | COMMUNITY
Start: 2020-05-21 | End: 1900-01-01

## 2020-05-26 LAB
C DIFF TOXIN B QL PCR REFLEX: NORMAL
GDH ANTIGEN: DETECTED
GI PCR PANEL, STOOL: NORMAL
TOXIN A AND B: DETECTED

## 2020-05-27 ENCOUNTER — APPOINTMENT (OUTPATIENT)
Dept: CT IMAGING | Facility: CLINIC | Age: 55
End: 2020-05-27

## 2020-06-03 ENCOUNTER — APPOINTMENT (OUTPATIENT)
Dept: GASTROENTEROLOGY | Facility: AMBULATORY MEDICAL SERVICES | Age: 55
End: 2020-06-03

## 2020-06-04 ENCOUNTER — APPOINTMENT (OUTPATIENT)
Dept: GASTROENTEROLOGY | Facility: CLINIC | Age: 55
End: 2020-06-04
Payer: COMMERCIAL

## 2020-06-04 PROCEDURE — 99213 OFFICE O/P EST LOW 20 MIN: CPT | Mod: 95

## 2020-06-04 NOTE — HISTORY OF PRESENT ILLNESS
[Medical Office: ([facility])___] : at the medical office located in  [Home] : at home, [unfilled] , at the time of the visit. [Verbal consent obtained from patient] : the patient, [unfilled] [FreeTextEntry1] : Patient and I had a telephonic video visit secondary to current coronavirus situation the visit lasted approximately 20 minutes. The patient has been in touch with me by phone on multiple occasions over recent weeks. She had recurrent C. difficile after tapering her vancomycin. Patient is currently feeling better and has now completed a two-week course of vancomycin 250 q.i.d. She currently denies nausea vomiting fever chills rectal bleeding or melena. Bowel movements are described as formed and regular

## 2020-06-04 NOTE — ASSESSMENT
[FreeTextEntry1] : Impression and plan\par \par Patient had recurrent C. difficile infection she is now completing a course of vancomycin for the last 2 weeks and feels better. At this time I will recommend tapering vancomycin over the next 2 weeks as instructed. She will call me before discontinuing her last dose and we will decide together if additional therapy might be required. Patient to call back again if any new issues or complaints. Eventual colonoscopy for evaluation of colonic mucosa is suggested. Patient will schedule sometime within the next month or 2.

## 2020-06-08 ENCOUNTER — APPOINTMENT (OUTPATIENT)
Dept: INFECTIOUS DISEASE | Facility: CLINIC | Age: 55
End: 2020-06-08
Payer: COMMERCIAL

## 2020-06-08 PROCEDURE — 99443: CPT

## 2020-06-08 RX ORDER — CIPROFLOXACIN HYDROCHLORIDE 500 MG/1
500 TABLET, FILM COATED ORAL TWICE DAILY
Qty: 14 | Refills: 0 | Status: DISCONTINUED | COMMUNITY
Start: 2017-12-05 | End: 2020-06-08

## 2020-06-08 RX ORDER — CLINDAMYCIN PHOSPHATE 10 MG/ML
1 LOTION TOPICAL
Qty: 120 | Refills: 0 | Status: DISCONTINUED | COMMUNITY
Start: 2019-09-23 | End: 2020-06-08

## 2020-06-08 NOTE — CONSULT LETTER
[Dear  ___] : Dear  [unfilled], [Consult Letter:] : I had the pleasure of evaluating your patient, [unfilled]. [Please see my note below.] : Please see my note below. [Consult Closing:] : Thank you very much for allowing me to participate in the care of this patient.  If you have any questions, please do not hesitate to contact me. [Sincerely,] : Sincerely, [FreeTextEntry2] : Dr. Danielito Gibson [FreeTextEntry3] : \par Denisa Pan MD\par  of Medicine\par Division of Infectious Diseases\par [facility]\par [address] [place] [address] [facility]\[place] [address]\par Tel: [phone]\par Fax: [phone] [[facility]  ___] : Dr. MERA

## 2020-06-08 NOTE — HISTORY OF PRESENT ILLNESS
[Home] : at home, [unfilled] , at the time of the visit. [Medical Office: ([facility])___] : at the medical office located in  [Verbal consent obtained from patient] : the patient, [unfilled] [FreeTextEntry1] : Unable to connect over video - transitioned to phone visit.\par \par 55 F HLD, hypothyroid, prior melanoma s/p surgery,  diverticulosis, occasional episdoes of diverticulitis. Last case 1/23/20.  Given cipro and metronidazole at that time.\par \par She was then away 2/18/20 and felt bloated.  Went to see GI 3/6/2020.\par Not felt to have diverticulitis.  She was having mucus discharge in her stool. Denies diarrhea.  Did have nausea.\par \par Had blood work done which had normal WBC.\par Stool studies done and c diff was found.\par Given vancomycin x 10 days. Episode 1\par After stopping mucus discharge returned about 10-14 days later.\par \par Placed back on vanco Episode 2\par After 4 days of stopping it, got symptoms again slightly worse.\par \par Went back on vanco x 10 days again.  Episode 3\par After she stopped it came back with diarrhea now. First time she couldn't leave the house with this.\par \par Episode 4 - vanco 250 q6hrs x 14 days\par Then went to TID x 5 days\par Tomorrow BID x 5 days\par Then planned for daily x 5 days.

## 2020-06-08 NOTE — ASSESSMENT
[FreeTextEntry1] : 55 F with Hypothyroid, diverticulosis, HLD presents today for recurrent C diff today.\par \par This is her 4th episode of c diff. She is currently on a vancomycin taper.\par She is feeling better now but now quite back to her baseline.\par \par Discussed her vanco taper and we have extended it slightly.\par \par She took vanco 250 mg po q6 x 2 weeks,\par Now completing vanco 250  q8 x 1 week,\par then vanco 250 mg q12 x 1 week,\par then vanco 250 mg qday x 1 week\par then vanco 250 mg every other day x 2 weeks.\par \par Advised trial of kefir probiotic.\par Avoid all other antibiotics if possible as could lease to recurrent c diff.\par Would give concomitant vancomycin po if she needs other antibiotics in the near future.\par \par Agree with GI for colonoscopy in the future when able.\par \par Pt to f/up with me in 5 weeks.\par \par Time spent 25 minutes.

## 2020-07-13 ENCOUNTER — TRANSCRIPTION ENCOUNTER (OUTPATIENT)
Age: 55
End: 2020-07-13

## 2020-07-13 ENCOUNTER — APPOINTMENT (OUTPATIENT)
Dept: INFECTIOUS DISEASE | Facility: CLINIC | Age: 55
End: 2020-07-13
Payer: COMMERCIAL

## 2020-07-13 PROCEDURE — 99442: CPT

## 2020-07-13 NOTE — CONSULT LETTER
[Dear  ___] : Dear  [unfilled], [Please see my note below.] : Please see my note below. [Consult Letter:] : I had the pleasure of evaluating your patient, [unfilled]. [Sincerely,] : Sincerely, [Consult Closing:] : Thank you very much for allowing me to participate in the care of this patient.  If you have any questions, please do not hesitate to contact me. [[facility]  ___] : Dr. MERA [FreeTextEntry2] : Dr. Danielito Gibson [FreeTextEntry3] : \par Denisa Pan MD\par  of Medicine\par Division of Infectious Diseases\par [facility] at [facility]\par [address] [place] [address] [facility]\[place] [address]\par Tel: [phone]\par Fax: [phone]

## 2020-07-13 NOTE — ASSESSMENT
[FreeTextEntry1] : 55 F with Hypothyroid, diverticulosis, HLD presents today for recurrent C diff today.\par \par Just  completed her long taper for C diff after her 4th episode of c diff. \par She is feeling better now but not quite back to her baseline.\par \par She took vanco 250 mg po q6 x 2 weeks,\par vanco 250  q8 x 1 week,\par  vanco 250 mg q12 x 1 week,\par vanco 250 mg qday x 1 week\par vanco 250 mg every other day x 2 weeks.\par \par No longer taking kefir.\par Avoid all other antibiotics if possible as could lease to recurrent c diff.\par Would give concomitant vancomycin po if she needs other antibiotics in the near future.\par \par Agree with GI for colonoscopy in the future when able.\par \par Pt to f/up with me if needed.\par \par Time spent 16 minutes

## 2020-07-13 NOTE — HISTORY OF PRESENT ILLNESS
[Home] : at home, [unfilled] , at the time of the visit. [Medical Office: ([facility])___] : at the medical office located in  [Verbal consent obtained from patient] : the patient, [unfilled] [FreeTextEntry1] : F/up for  C diff today.\par \par 55 F HLD, hypothyroid, prior melanoma s/p surgery,  diverticulosis, occasional episdoes of diverticulitis. Last case 1/23/20.  Given cipro and metronidazole at that time.\par \par She was then away 2/18/20 and felt bloated.  Went to see GI 3/6/2020.\par Not felt to have diverticulitis.  She was having mucus discharge in her stool. Denies diarrhea.  Did have nausea.\par \par Had blood work done which had normal WBC.\par Stool studies done and c diff was found.\par Given vancomycin x 10 days. Episode 1\par After stopping mucus discharge returned about 10-14 days later.\par \par Placed back on vanco Episode 2\par After 4 days of stopping it, got symptoms again slightly worse.\par \par Went back on vanco x 10 days again.  Episode 3\par After she stopped it came back with diarrhea now. First time she couldn't leave the house with this.\par \par Episode 4 - vanco 250 q6hrs x 14 days\par Completed long vanco taper.\par Finished 7/10/2020. \par Weight is stable.  Did lose 20 lbs initially but is maintaining now.  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 \par Stools now are formed.  She takes fiber to help her go.\par Eating lentils.\par Sometimes has strands in her stools and can have fresh blood in her stool which she thinks is from irritation.\par Doesn't feel stools are back to baseline.

## 2020-07-13 NOTE — PHYSICAL EXAM
[General Appearance - Alert] : alert [General Appearance - In No Acute Distress] : in no acute distress [Affect] : the affect was normal [Oriented To Time, Place, And Person] : oriented to person, place, and time [] : no respiratory distress

## 2020-10-13 DIAGNOSIS — Z20.828 CONTACT WITH AND (SUSPECTED) EXPOSURE TO OTHER VIRAL COMMUNICABLE DISEASES: ICD-10-CM

## 2020-10-13 DIAGNOSIS — Z01.818 ENCOUNTER FOR OTHER PREPROCEDURAL EXAMINATION: ICD-10-CM

## 2020-10-28 RX ORDER — SODIUM SULFATE, POTASSIUM SULFATE, MAGNESIUM SULFATE 17.5; 3.13; 1.6 G/ML; G/ML; G/ML
17.5-3.13-1.6 SOLUTION, CONCENTRATE ORAL
Qty: 1 | Refills: 0 | Status: ACTIVE | COMMUNITY
Start: 2020-10-28 | End: 1900-01-01

## 2020-11-02 LAB — SARS-COV-2 N GENE NPH QL NAA+PROBE: NOT DETECTED

## 2020-11-04 ENCOUNTER — APPOINTMENT (OUTPATIENT)
Dept: GASTROENTEROLOGY | Facility: AMBULATORY MEDICAL SERVICES | Age: 55
End: 2020-11-04
Payer: COMMERCIAL

## 2020-11-04 PROCEDURE — 45378 DIAGNOSTIC COLONOSCOPY: CPT

## 2020-11-05 ENCOUNTER — APPOINTMENT (OUTPATIENT)
Dept: SURGICAL ONCOLOGY | Facility: CLINIC | Age: 55
End: 2020-11-05
Payer: COMMERCIAL

## 2020-11-05 VITALS
SYSTOLIC BLOOD PRESSURE: 135 MMHG | RESPIRATION RATE: 16 BRPM | BODY MASS INDEX: 43.63 KG/M2 | HEIGHT: 67 IN | DIASTOLIC BLOOD PRESSURE: 66 MMHG | WEIGHT: 278 LBS | TEMPERATURE: 98.1 F | HEART RATE: 64 BPM | OXYGEN SATURATION: 98 %

## 2020-11-05 PROCEDURE — 99214 OFFICE O/P EST MOD 30 MIN: CPT

## 2020-11-05 PROCEDURE — 99072 ADDL SUPL MATRL&STAF TM PHE: CPT

## 2020-11-05 RX ORDER — TRIAMCINOLONE ACETONIDE 1 MG/G
0.1 CREAM TOPICAL
Qty: 453 | Refills: 0 | Status: ACTIVE | COMMUNITY
Start: 2020-10-28

## 2020-11-05 NOTE — HISTORY OF PRESENT ILLNESS
[de-identified] : 55 year-old lady who March 2018 had wide excision of a possible 0.25 mm melanoma from the posterior RIGHT SHOULDER with negative margins and reconstruction by Dr. Gregorio.\par \par February 2017: wide excision of an in situ melanoma from her of her RIGHT SHOULDER, with negative margins.\par \par No other personal history of skin cancer.\par \par No other personal history of malignancy.\par \par No relatives with melanoma.\par There are no relatives with skin cancer.\par \par She returns to scheduled followup today.\par She is asymptomatic\par \par \par Her dermatologist was Dr. Rodrigo Crook.\par NOW sees Dr Perri BOATENG @[facility], October 2020 visit was unremarkable.\par She also sees Dr. Tonie Ravi, June 2020 appointment is scheduled\par \par \par She has OBSTRUCTIVE SLEEP APNEA, her pulmonologist is Dr. Med MORENO, she uses a CPAP mask at home\par \par \par Her internist is Dr. Osorio CHANEL.\par \par She does not have a pacemaker or defibrillator.\par She takes precautionary baby aspirin daily.\par \par She's on Bystolic for hypertension.\par Rosuvastatin is used for hypercholesterolemia.\par She is on Synthroid for hypothyroidism, She does not see an endocrinologist.\par \par Her gynecologist is Dr. Raphael CARBAJAL\par She last saw him September 2019 - [place]\par \par Her annual breast imaging is orchestrated By her gynecologist , October 2020 at [place] - ok\par \par +FH:\par Her maternal grandmother had breast cancer at age 52.\par Her paternal grandmother had ovarian cancer.\par \par An eye examination November 2019 was normal., Dr Alfonso Marte\par \par She had colonoscopy March 2018 with Dr. Edmar Frye - ok x 5 yr\par History of diverticular disease.\par 2020:\par C. DIFF colitis which developed as a result of the antibiotic therapy for diverticulitis.\par GI: Dr. Karson HIGGINS.\par He repeated the colonoscopy November 2020, because of the above colitis, and it was normal\par ID: Dr. Denisa ADAM\par

## 2020-11-05 NOTE — PHYSICAL EXAM
[Normal] : supple, no neck mass and thyroid not enlarged [Normal Neck Lymph Nodes] : normal neck lymph nodes  [Normal Supraclavicular Lymph Nodes] : normal supraclavicular lymph nodes [Normal Axillary Lymph Nodes] : normal axillary lymph nodes [Normal] : grossly intact [de-identified] : Groins not examined [de-identified] : Below

## 2020-11-05 NOTE — ASSESSMENT
[FreeTextEntry1] : 11-22-19:\par Chest x-ray at [place]: clear lungs.\par Prescription for November 2020 was entered 04/18/20 -prescription verified\par \par Clinically doing well.\par I've asked to see her in another year, sooner if needed\par \par \par

## 2020-11-05 NOTE — REVIEW OF SYSTEMS
[Negative] : Heme/Lymph [FreeTextEntry5] : Hypertension [FreeTextEntry6] : COPD, uses home CPAP [FreeTextEntry8] : History of diverticular disease, C. difficile colitis [de-identified] : Melanoma [de-identified] : Hypothyroid

## 2020-11-23 ENCOUNTER — OUTPATIENT (OUTPATIENT)
Dept: OUTPATIENT SERVICES | Facility: HOSPITAL | Age: 55
LOS: 1 days | End: 2020-11-23
Payer: COMMERCIAL

## 2020-11-23 ENCOUNTER — RESULT REVIEW (OUTPATIENT)
Age: 55
End: 2020-11-23

## 2020-11-23 ENCOUNTER — APPOINTMENT (OUTPATIENT)
Dept: RADIOLOGY | Facility: CLINIC | Age: 55
End: 2020-11-23
Payer: COMMERCIAL

## 2020-11-23 DIAGNOSIS — Z98.890 OTHER SPECIFIED POSTPROCEDURAL STATES: Chronic | ICD-10-CM

## 2020-11-23 DIAGNOSIS — C43.61 MALIGNANT MELANOMA OF RIGHT UPPER LIMB, INCLUDING SHOULDER: ICD-10-CM

## 2020-11-23 PROCEDURE — 71046 X-RAY EXAM CHEST 2 VIEWS: CPT | Mod: 26

## 2020-11-23 PROCEDURE — 71046 X-RAY EXAM CHEST 2 VIEWS: CPT

## 2020-12-30 ENCOUNTER — APPOINTMENT (OUTPATIENT)
Dept: GASTROENTEROLOGY | Facility: CLINIC | Age: 55
End: 2020-12-30
Payer: COMMERCIAL

## 2020-12-30 VITALS
HEART RATE: 74 BPM | SYSTOLIC BLOOD PRESSURE: 120 MMHG | OXYGEN SATURATION: 98 % | WEIGHT: 280 LBS | HEIGHT: 67 IN | TEMPERATURE: 98.2 F | BODY MASS INDEX: 43.95 KG/M2 | DIASTOLIC BLOOD PRESSURE: 60 MMHG

## 2020-12-30 PROCEDURE — 99072 ADDL SUPL MATRL&STAF TM PHE: CPT

## 2020-12-30 PROCEDURE — 99214 OFFICE O/P EST MOD 30 MIN: CPT

## 2020-12-30 NOTE — ASSESSMENT
[FreeTextEntry1] : Impression and plan\par \par Patient came to the office today with now resolved left lower quadrant abdominal discomfort. She was constipated at the time but is now feeling better. I suggested that she take over-the-counter fiber supplements along with MiraLax to ensure bowel regularity and consistency. Patient will keep me updated by telephone regarding her status I do not feel that scanning nor antibiotics is necessary at this time. Further suggestions will follow p.r.n.

## 2020-12-30 NOTE — HISTORY OF PRESENT ILLNESS
[FreeTextEntry1] : Patient came to the office today with left lower quadrant abdominal discomfort which has since resolved after making the appointment. A few days ago patient was expressing left-sided pain with some mucus. She felt constipated at the time. The patient denies associated fever chills nausea vomiting rectal bleeding or melena. Patient is concerned about recurrence of C. difficile and possible diverticulitis.

## 2021-07-07 LAB — GI PCR PANEL, STOOL: ABNORMAL

## 2021-07-13 LAB
C DIFF TOXIN B QL PCR REFLEX: NORMAL
GDH ANTIGEN: DETECTED
TOXIN A AND B: DETECTED

## 2021-07-13 RX ORDER — VANCOMYCIN HYDROCHLORIDE 250 MG/1
250 CAPSULE ORAL 4 TIMES DAILY
Qty: 40 | Refills: 2 | Status: ACTIVE | COMMUNITY
Start: 2021-07-13 | End: 1900-01-01

## 2021-07-14 ENCOUNTER — NON-APPOINTMENT (OUTPATIENT)
Age: 56
End: 2021-07-14

## 2021-07-16 ENCOUNTER — APPOINTMENT (OUTPATIENT)
Dept: INFECTIOUS DISEASE | Facility: CLINIC | Age: 56
End: 2021-07-16
Payer: COMMERCIAL

## 2021-07-16 VITALS
OXYGEN SATURATION: 97 % | TEMPERATURE: 97.9 F | HEIGHT: 67 IN | SYSTOLIC BLOOD PRESSURE: 121 MMHG | DIASTOLIC BLOOD PRESSURE: 73 MMHG | HEART RATE: 70 BPM | WEIGHT: 282 LBS | BODY MASS INDEX: 44.26 KG/M2

## 2021-07-16 PROCEDURE — 99213 OFFICE O/P EST LOW 20 MIN: CPT

## 2021-07-16 PROCEDURE — 99072 ADDL SUPL MATRL&STAF TM PHE: CPT

## 2021-07-16 NOTE — PHYSICAL EXAM
[General Appearance - Alert] : alert [General Appearance - In No Acute Distress] : in no acute distress [General Appearance - Well Nourished] : well nourished [General Appearance - Well-Appearing] : healthy appearing [Sclera] : the sclera and conjunctiva were normal [Outer Ear] : the ears and nose were normal in appearance [Oropharynx] : the oropharynx was normal with no thrush [Neck Appearance] : the appearance of the neck was normal [] : no respiratory distress [Auscultation Breath Sounds / Voice Sounds] : lungs were clear to auscultation bilaterally [Heart Rate And Rhythm] : heart rate was normal and rhythm regular [Heart Sounds] : normal S1 and S2 [Bowel Sounds] : normal bowel sounds [Abdomen Soft] : soft [Abdomen Tenderness] : non-tender [Skin Lesions] : no skin lesions [No Focal Deficits] : no focal deficits [Oriented To Time, Place, And Person] : oriented to person, place, and time [Affect] : the affect was normal

## 2021-07-16 NOTE — CONSULT LETTER
[Dear  ___] : Dear  [unfilled], [Consult Letter:] : I had the pleasure of evaluating your patient, [unfilled]. [Please see my note below.] : Please see my note below. [Consult Closing:] : Thank you very much for allowing me to participate in the care of this patient.  If you have any questions, please do not hesitate to contact me. [Sincerely,] : Sincerely, [Dr[facility]ico  ___] : Dr. MERA [FreeTextEntry2] : Dr. Danielito Gibson [FreeTextEntry3] : \par Denisa Pan MD\par  of Medicine\par Division of Infectious Diseases\par [facility]\par [address] [place] [address] DrTico\[place] [address]\par Tel: [phone]\par Fax: [phone]

## 2021-07-16 NOTE — HISTORY OF PRESENT ILLNESS
[FreeTextEntry1] : F/up for  C diff today.\par \par 56 F HLD, hypothyroid, prior melanoma s/p surgery,  diverticulosis, occasional episodes of diverticulitis. Last case 1/23/20.  Given cipro and metronidazole at that time.\par \par She was then away 2/18/20 and felt bloated.  Went to see GI 3/6/2020.\par Not felt to have diverticulitis.  She was having mucus discharge in her stool. Denies diarrhea.  Did have nausea.\par \par Had blood work done which had normal WBC.\par Stool studies done and c diff was found.\par Given vancomycin x 10 days. Episode 1\par After stopping mucus discharge returned about 10-14 days later.\par \par Placed back on vanco Episode 2\par After 4 days of stopping it, got symptoms again slightly worse.\par \par Went back on vanco x 10 days again.  Episode 3\par After she stopped it came back with diarrhea now. First time she couldn't leave the house with this.\par \par Episode 4 - vanco 250 q6hrs x 14 days\par Completed long vanco taper.\par Finished 7/10/2020. \par \par After this was doing okay until 1 month ago June 2021. Developed diarrhea.  Lasted ~ 3 weeks.  Now improved. \par \par Depending on what she eats, she gets a bloated feeling and dull pain on left side.\par \par She saw GI.  Tested positive for C diff and E coli on 7/6/21\par Was away in [facility] and ate shell fish and 1 night had multiple episodes of diarrhea.  \par Was not given abx for E coli. \par Given vanco 250 mg po q6 x 10 days - never even took it. \par Today she feels fine and resolved itself. \par She has normal stools, full BM, taking different probiotic now, stool is formed, solid, has 1-2 BM's day which is normal for her.  She takes pre and pro biotics, kefir intermittently.

## 2021-07-16 NOTE — ASSESSMENT
[FreeTextEntry1] : 56 F with Hypothyroid, diverticulosis, HLD presents today for recurrent C diff today.\par \par Had 4 episodes of C diff in 2020 and improved after long vanco taper.\par \par Now with likely gastroenteritis from eating out, stool positive for E coli and also C diff at the same time.\par \par Stools are back to baseline now. I suspect she had incidental finding of C diff and not true C diff infection.  Likely the E coli was the true pathogen here.\par \par She never took any antibiotic for this and is feeling better now.\par \par I favor observing at this point in time since she feels better. Hold off on vanco po.\par I asked her to call me if any symptoms return.\par Continue with probiotics.  \par \par Avoid all other antibiotics if possible as could lead to recurrent c diff.\par Would give concomitant vancomycin po if she needs other antibiotics in the near future.\par \par Pt to f/up with me if needed.\par \par Time spent 20 minutes

## 2021-07-26 ENCOUNTER — NON-APPOINTMENT (OUTPATIENT)
Age: 56
End: 2021-07-26

## 2021-08-04 ENCOUNTER — APPOINTMENT (OUTPATIENT)
Dept: GASTROENTEROLOGY | Facility: AMBULATORY MEDICAL SERVICES | Age: 56
End: 2021-08-04
Payer: COMMERCIAL

## 2021-08-04 PROCEDURE — 43239 EGD BIOPSY SINGLE/MULTIPLE: CPT

## 2021-08-25 DIAGNOSIS — A04.72 ENTEROCOLITIS DUE TO CLOSTRIDIUM DIFFICILE, NOT SPECIFIED AS RECURRENT: ICD-10-CM

## 2021-09-02 ENCOUNTER — TRANSCRIPTION ENCOUNTER (OUTPATIENT)
Age: 56
End: 2021-09-02

## 2021-10-06 PROBLEM — I10 ESSENTIAL HYPERTENSION: Status: ACTIVE | Noted: 2017-12-15

## 2021-10-18 ENCOUNTER — NON-APPOINTMENT (OUTPATIENT)
Age: 56
End: 2021-10-18

## 2021-11-04 ENCOUNTER — APPOINTMENT (OUTPATIENT)
Dept: SURGICAL ONCOLOGY | Facility: CLINIC | Age: 56
End: 2021-11-04
Payer: COMMERCIAL

## 2021-11-04 VITALS
OXYGEN SATURATION: 98 % | TEMPERATURE: 97.2 F | BODY MASS INDEX: 44.42 KG/M2 | HEIGHT: 67 IN | SYSTOLIC BLOOD PRESSURE: 149 MMHG | WEIGHT: 283 LBS | HEART RATE: 77 BPM | DIASTOLIC BLOOD PRESSURE: 87 MMHG | RESPIRATION RATE: 17 BRPM

## 2021-11-04 PROCEDURE — 99214 OFFICE O/P EST MOD 30 MIN: CPT

## 2021-11-04 NOTE — REVIEW OF SYSTEMS
[Negative] : Heme/Lymph [FreeTextEntry5] : Hypertension [FreeTextEntry8] : C. difficile colitis [de-identified] : Melanoma [de-identified] : Hypothyroid

## 2021-11-04 NOTE — PHYSICAL EXAM
[Normal] : supple, no neck mass and thyroid not enlarged [Normal Neck Lymph Nodes] : normal neck lymph nodes  [Normal Supraclavicular Lymph Nodes] : normal supraclavicular lymph nodes [Normal Axillary Lymph Nodes] : normal axillary lymph nodes [Normal] : grossly intact [de-identified] : Groins not examined [de-identified] : Below

## 2021-11-04 NOTE — ASSESSMENT
[FreeTextEntry1] : November 2020 chest x-ray at [place] was normal.\par Prescription entered for November 2021.\par \par Clinically doing well.\par I've asked to see her in another year, sooner if needed\par \par \par

## 2021-11-17 ENCOUNTER — OUTPATIENT (OUTPATIENT)
Dept: OUTPATIENT SERVICES | Facility: HOSPITAL | Age: 56
LOS: 1 days | End: 2021-11-17
Payer: COMMERCIAL

## 2021-11-17 ENCOUNTER — APPOINTMENT (OUTPATIENT)
Dept: RADIOLOGY | Facility: CLINIC | Age: 56
End: 2021-11-17
Payer: COMMERCIAL

## 2021-11-17 DIAGNOSIS — Z98.890 OTHER SPECIFIED POSTPROCEDURAL STATES: Chronic | ICD-10-CM

## 2021-11-17 DIAGNOSIS — Z85.820 PERSONAL HISTORY OF MALIGNANT MELANOMA OF SKIN: ICD-10-CM

## 2021-11-17 PROCEDURE — 71046 X-RAY EXAM CHEST 2 VIEWS: CPT | Mod: 26

## 2021-11-17 PROCEDURE — 71046 X-RAY EXAM CHEST 2 VIEWS: CPT

## 2021-12-08 ENCOUNTER — APPOINTMENT (OUTPATIENT)
Dept: SURGICAL ONCOLOGY | Facility: CLINIC | Age: 56
End: 2021-12-08
Payer: COMMERCIAL

## 2021-12-08 VITALS
SYSTOLIC BLOOD PRESSURE: 125 MMHG | WEIGHT: 288 LBS | BODY MASS INDEX: 45.2 KG/M2 | RESPIRATION RATE: 16 BRPM | HEART RATE: 72 BPM | OXYGEN SATURATION: 98 % | TEMPERATURE: 97.7 F | DIASTOLIC BLOOD PRESSURE: 76 MMHG | HEIGHT: 67 IN

## 2021-12-08 DIAGNOSIS — R92.8 OTHER ABNORMAL AND INCONCLUSIVE FINDINGS ON DIAGNOSTIC IMAGING OF BREAST: ICD-10-CM

## 2021-12-08 PROCEDURE — 99204 OFFICE O/P NEW MOD 45 MIN: CPT

## 2021-12-08 PROCEDURE — 99214 OFFICE O/P EST MOD 30 MIN: CPT

## 2021-12-08 NOTE — HISTORY OF PRESENT ILLNESS
[de-identified] : Ms. JEREMY LARKIN  is a 56 year  old female  presenting for an initial consultation, referred by Dr. Raphael Herrera. \par \par B/L MMG/US 10/2021- Scattered fibroglandular tissue. No suspicious findings. New hypoechoic structure in right breast 10:00, 7cmfn, probable cyst with debris. Six moth right breast ultrasound f/u is recommended. BIRADS 3 \par \par Hx benign breast biopsy. Pt denies palpable breast masses, nipple discharge, skin dimpling, inversion or deviation. \par \par Past medical history notable for right posterior shoulder melanoma (excised by Dr. Swartz), MARIAA (wears CPAP), HTN, HLD, Hypothyroidism, diverticular disease, E. coli colitis, CDIFF colitis.  Family history includes maternal grandmother with breast cancer @ age 52 and paternal grandmother with ovarian cancer.

## 2021-12-08 NOTE — REASON FOR VISIT
[Initial Consultation] : an initial consultation for [FreeTextEntry2] : Dense breasts ; BIRADS  3 breast ultrasound

## 2021-12-08 NOTE — PHYSICAL EXAM
[Normal] : supple, no neck mass and thyroid not enlarged [Normal Supraclavicular Lymph Nodes] : normal supraclavicular lymph nodes [Normal Axillary Lymph Nodes] : normal axillary lymph nodes [Normal] : oriented to person, place and time, with appropriate affect [FreeTextEntry1] : El present during exam  [de-identified] : no palpable masses ,nipple discharge, skin dimpling, inversion or deviation bilaterally

## 2021-12-08 NOTE — ASSESSMENT
[FreeTextEntry1] : IMP:\par Dense; hx breast cyst\par +FH\par B/L MMG/US 10/2021- Scattered fibroglandular tissue. No suspicious findings. New hypoechoic structure in right breast 10:00, 7cmfn, probable cyst with debris. Six moth right breast ultrasound f/u is recommended. BIRADS 3 \par No suspicious findings on exam \par \par \par PLAN:\par Right breast US 4/2022\par

## 2021-12-08 NOTE — CONSULT LETTER
[Dear  ___] : Dear  [unfilled], [Consult Letter:] : I had the pleasure of evaluating your patient, [unfilled]. [Consult Closing:] : Thank you very much for allowing me to participate in the care of this patient.  If you have any questions, please do not hesitate to contact me. [Sincerely,] : Sincerely, [FreeTextEntry2] : Dr. Raphael Herrera [FreeTextEntry3] : Dr. John Singletary

## 2022-09-12 ENCOUNTER — NON-APPOINTMENT (OUTPATIENT)
Age: 57
End: 2022-09-12

## 2022-09-29 ENCOUNTER — APPOINTMENT (OUTPATIENT)
Dept: GASTROENTEROLOGY | Facility: CLINIC | Age: 57
End: 2022-09-29

## 2022-11-10 ENCOUNTER — APPOINTMENT (OUTPATIENT)
Dept: SURGICAL ONCOLOGY | Facility: CLINIC | Age: 57
End: 2022-11-10

## 2022-11-10 VITALS
HEART RATE: 64 BPM | SYSTOLIC BLOOD PRESSURE: 134 MMHG | RESPIRATION RATE: 16 BRPM | HEIGHT: 67 IN | BODY MASS INDEX: 42.69 KG/M2 | DIASTOLIC BLOOD PRESSURE: 85 MMHG | OXYGEN SATURATION: 96 % | WEIGHT: 272 LBS

## 2022-11-10 DIAGNOSIS — D03.61 MELANOMA IN SITU OF RIGHT UPPER LIMB, INCLUDING SHOULDER: ICD-10-CM

## 2022-11-10 PROCEDURE — 99214 OFFICE O/P EST MOD 30 MIN: CPT

## 2022-11-10 NOTE — PHYSICAL EXAM
[Normal] : supple, no neck mass and thyroid not enlarged [Normal Neck Lymph Nodes] : normal neck lymph nodes  [Normal Supraclavicular Lymph Nodes] : normal supraclavicular lymph nodes [Normal Axillary Lymph Nodes] : normal axillary lymph nodes [Normal] : grossly intact [de-identified] : Groins not examined [de-identified] : Below

## 2022-11-10 NOTE — ASSESSMENT
[FreeTextEntry1] : November 2021 chest x-ray at [place] was normal.\par We will repeat every other year, November 2023....................\par \par Clinically doing well.\par I've asked to see her in another year, sooner if needed\par \par \par

## 2022-11-10 NOTE — REVIEW OF SYSTEMS
[Negative] : Heme/Lymph [FreeTextEntry5] : History of hypertension [FreeTextEntry6] : History of sleep apnea [FreeTextEntry8] : History of colitis [de-identified] : History of melanoma [de-identified] : History of hypothyroidism

## 2022-11-10 NOTE — HISTORY OF PRESENT ILLNESS
[de-identified] : 57 year-old lady with a history of prior melanoma excisions:\par \par March 2018: Wide excision of a possible 0.25 mm melanoma from the posterior RIGHT SHOULDER with negative margins and reconstruction by Dr. Alvarez Gregorio.\par \par February 2017: wide excision of an in situ melanoma from her of her RIGHT SHOULDER, with negative margins.\par \par No other personal history of skin cancer.\par \par No other personal history of malignancy.\par \par No relatives with melanoma.\par There are no relatives with skin cancer.\par \par +FH:\par Her maternal grandmother had breast cancer at age 52.\par Her paternal grandmother had ovarian cancer.\par She sees my partner Dr. John Singletary for periodic breast examinations\par \par \par She returns to scheduled followup today.\par She is asymptomatic\par \par \par Her dermatologist was Dr. Rodrigo Crook.\par NOW sees Dr Perri BOATENG @[facility], September 2022 visit was unremarkable.\par She also sees Dr. Tonie Ravi, June 2022 appointment is scheduled\par \par \par Her internist is Dr. Osorio CHANEL.\par \par She does not have a pacemaker or defibrillator.\par She takes precautionary baby aspirin daily.\par \par She has OBSTRUCTIVE SLEEP APNEA, her pulmonologist is Dr. Med MORENO, she uses a CPAP mask at home\par \par + Hypertension.\par Treated with Bystolic.\par \par + Hypercholesterolemia.\par She takes daily rosuvastatin.\par \par Effexor added to relieve stress and help control blood pressure.\par Cardiology: Dr. Carlos A AREVALO\par \par + Hypothyroidism.\par She takes Synthroid.\par She does not see an endocrinologist.\par \par \par Her gynecologist is Dr. Raphael CARBAJAL\par Seen August 2021\par \par Her annual breast imaging is orchestrated By her gynecologist , October 2022 at [facility]\par \par +FH:\par Her maternal grandmother had breast cancer at age 52.\par Her paternal grandmother had ovarian cancer.\par \par An eye examination summer 2022 was normal., Dr Alfonso PIZARRO.\par She will be scheduling a follow-up visit\par .\par \par She had colonoscopy March 2018 with Dr. Edmar Frye - ok x 5 yr\par History of diverticular disease.\par 2020:\par C. DIFF colitis which developed as a result of the antibiotic therapy for diverticulitis.\par GI: Dr. Karson HIGGINS.\par He repeated the colonoscopy November 2020, because of the above colitis, and it was normal\par ID: Dr. Denisa ADAM.\par \par July 2020 when she first had E. coli colitis, complicated by recurrent C. difficile colitis.\par Ultimately, through the use of probiotics and dietary modification she has addressed all of her symptoms.\par

## 2023-11-09 ENCOUNTER — APPOINTMENT (OUTPATIENT)
Dept: SURGICAL ONCOLOGY | Facility: CLINIC | Age: 58
End: 2023-11-09
Payer: COMMERCIAL

## 2023-11-09 VITALS
BODY MASS INDEX: 44.73 KG/M2 | SYSTOLIC BLOOD PRESSURE: 132 MMHG | WEIGHT: 285 LBS | DIASTOLIC BLOOD PRESSURE: 84 MMHG | HEIGHT: 67 IN | OXYGEN SATURATION: 98 % | HEART RATE: 72 BPM | RESPIRATION RATE: 16 BRPM

## 2023-11-09 DIAGNOSIS — Z85.820 PERSONAL HISTORY OF MALIGNANT MELANOMA OF SKIN: ICD-10-CM

## 2023-11-09 PROCEDURE — 99214 OFFICE O/P EST MOD 30 MIN: CPT

## 2023-12-02 ENCOUNTER — APPOINTMENT (OUTPATIENT)
Dept: RADIOLOGY | Facility: CLINIC | Age: 58
End: 2023-12-02
Payer: COMMERCIAL

## 2023-12-02 ENCOUNTER — OUTPATIENT (OUTPATIENT)
Dept: OUTPATIENT SERVICES | Facility: HOSPITAL | Age: 58
LOS: 1 days | End: 2023-12-02
Payer: COMMERCIAL

## 2023-12-02 DIAGNOSIS — Z85.820 PERSONAL HISTORY OF MALIGNANT MELANOMA OF SKIN: ICD-10-CM

## 2023-12-02 DIAGNOSIS — Z98.890 OTHER SPECIFIED POSTPROCEDURAL STATES: Chronic | ICD-10-CM

## 2023-12-02 PROCEDURE — 71046 X-RAY EXAM CHEST 2 VIEWS: CPT | Mod: 26

## 2023-12-02 PROCEDURE — 71046 X-RAY EXAM CHEST 2 VIEWS: CPT

## 2023-12-29 NOTE — ASSESSMENT
[FreeTextEntry1] : 58-year-old lady with a history of 2 previous melanomas.  She has biannual chest x-rays in November at our [place] location. Prescription entered today for 2023.  Asymptomatic, with normal imaging, I have offered to continue to see her annually, sooner if needed.  Reviewed in detail, all questions answered

## 2023-12-29 NOTE — HISTORY OF PRESENT ILLNESS
[de-identified] : 58-year-old lady lady with a history of prior melanoma excisions:  March 2018: Wide excision of a possible 0.25 mm melanoma from the posterior RIGHT SHOULDER with negative margins and reconstruction by Dr. Alvarez Gregorio.  February 2017: wide excision of an in situ melanoma from her of her RIGHT SHOULDER, with negative margins.  No other personal history of skin cancer.  No other personal history of malignancy.  No relatives with melanoma. There are no relatives with skin cancer.  +FH: Her maternal grandmother had breast cancer at age 52. Her paternal grandmother had ovarian cancer. She sees my partner Dr. John Singletary for periodic breast examinations.   She returns to scheduled follow-up today. She is asymptomatic.   Her dermatologist was Dr. Rodrigo Crook. NOW sees Dr Perri BOATENG @[facility], September 2023 visit was unremarkable. She also sees Dr. Tonie Ravi, October 2023 visit was unremarkable..   Her internist is Dr. Osorio CHAENL.  She does not have a pacemaker or defibrillator. She takes precautionary baby aspirin daily.  She has OBSTRUCTIVE SLEEP APNEA, her pulmonologist is Dr. Daron SALDIVAR, she uses a CPAP mask at home. She used to see Dr. Med Bardales.  + Hypertension. Treated with Bystolic.  + Hypercholesterolemia. She takes daily rosuvastatin.  Effexor added to relieve stress and help control blood pressure. Cardiology: Dr. Carlos A AREVALO  + Hypothyroidism. She takes Synthroid. She does not see an endocrinologist.   Her gynecologist is Dr. Raphael CARBAJAL Seen   September 2022  Her annual breast imaging is orchestrated by her gynecologist, October 2023 at [facility].  +FH: Her maternal grandmother had breast cancer at age 52. Her paternal grandmother had ovarian cancer.  An eye examination summer 2022 was normal., Dr Alfonso PIZARRO. She will be scheduling a follow-up visit .  She had colonoscopy March 2018 with Dr. Edmar wesley x 5 yr History of diverticular disease. 2020: C. DIFF colitis which developed as a result of the antibiotic therapy for diverticulitis. GI: Dr. Karson HIGGINS. He repeated the colonoscopy November 2020, because of the above colitis, and it was normal ID: Dr. Denisa ADAM.  July 2020 when she first had E. coli colitis, complicated by recurrent C. difficile colitis. Ultimately, through the use of probiotics and dietary modification she has addressed all of her symptoms.

## 2023-12-29 NOTE — REVIEW OF SYSTEMS
[FreeTextEntry5] : Hypertension [FreeTextEntry6] : MARIAA [de-identified] : History of melanoma [de-identified] : Hypothyroid

## 2024-08-25 NOTE — BRIEF OPERATIVE NOTE - PRE-OP DX
Test again positive for lupus anticoagulant previously negative will discuss on follow-up  Labs again consistent with lupus but nothing new and no new antibodies Melanoma of shoulder, right  03/23/2018  posterior  Active  Beg, Clement PATEL

## 2024-11-14 ENCOUNTER — APPOINTMENT (OUTPATIENT)
Dept: SURGICAL ONCOLOGY | Facility: CLINIC | Age: 59
End: 2024-11-14
Payer: COMMERCIAL

## 2024-11-14 VITALS
OXYGEN SATURATION: 98 % | DIASTOLIC BLOOD PRESSURE: 86 MMHG | SYSTOLIC BLOOD PRESSURE: 151 MMHG | HEIGHT: 67 IN | WEIGHT: 290 LBS | HEART RATE: 74 BPM | BODY MASS INDEX: 45.52 KG/M2 | RESPIRATION RATE: 16 BRPM

## 2024-11-14 DIAGNOSIS — C43.61 MALIGNANT MELANOMA OF RIGHT UPPER LIMB, INCLUDING SHOULDER: ICD-10-CM

## 2024-11-14 PROCEDURE — 99214 OFFICE O/P EST MOD 30 MIN: CPT
